# Patient Record
Sex: MALE | Race: WHITE | Employment: UNEMPLOYED | URBAN - METROPOLITAN AREA
[De-identification: names, ages, dates, MRNs, and addresses within clinical notes are randomized per-mention and may not be internally consistent; named-entity substitution may affect disease eponyms.]

---

## 2017-02-06 ENCOUNTER — TELEPHONE (OUTPATIENT)
Dept: RHEUMATOLOGY | Facility: CLINIC | Age: 57
End: 2017-02-06

## 2017-02-06 NOTE — TELEPHONE ENCOUNTER
Called pt, informed that received letter from insurance company stating Enbrel was not going to be covered but Humira is on formulary. Dr. Peg Dior will change medication to Humira 40mg SC every 2 months and teaching will be done at next visit.  Pt has 3 mor

## 2017-02-13 ENCOUNTER — OFFICE VISIT (OUTPATIENT)
Dept: RHEUMATOLOGY | Facility: CLINIC | Age: 57
End: 2017-02-13

## 2017-02-13 VITALS
RESPIRATION RATE: 18 BRPM | HEART RATE: 72 BPM | HEIGHT: 70 IN | DIASTOLIC BLOOD PRESSURE: 82 MMHG | BODY MASS INDEX: 25.62 KG/M2 | SYSTOLIC BLOOD PRESSURE: 170 MMHG | WEIGHT: 179 LBS

## 2017-02-13 DIAGNOSIS — M06.9 RHEUMATOID ARTHRITIS INVOLVING MULTIPLE SITES, UNSPECIFIED RHEUMATOID FACTOR PRESENCE: Primary | ICD-10-CM

## 2017-02-13 PROCEDURE — 99213 OFFICE O/P EST LOW 20 MIN: CPT | Performed by: INTERNAL MEDICINE

## 2017-02-13 RX ORDER — HYDROCODONE BITARTRATE AND ACETAMINOPHEN 10; 325 MG/1; MG/1
TABLET ORAL
Qty: 60 TABLET | Refills: 0 | Status: SHIPPED | OUTPATIENT
Start: 2017-05-11 | End: 2017-05-08

## 2017-02-13 RX ORDER — HYDROCODONE BITARTRATE AND ACETAMINOPHEN 10; 325 MG/1; MG/1
TABLET ORAL
Qty: 60 TABLET | Refills: 0 | Status: SHIPPED | OUTPATIENT
Start: 2017-03-11 | End: 2017-02-13

## 2017-02-13 RX ORDER — HYDROCODONE BITARTRATE AND ACETAMINOPHEN 10; 325 MG/1; MG/1
TABLET ORAL
Qty: 180 TABLET | Refills: 0 | Status: SHIPPED | OUTPATIENT
Start: 2017-02-18 | End: 2017-02-13

## 2017-02-13 RX ORDER — HYDROCODONE BITARTRATE AND ACETAMINOPHEN 10; 325 MG/1; MG/1
TABLET ORAL
Qty: 180 TABLET | Refills: 0 | Status: SHIPPED | OUTPATIENT
Start: 2017-03-18 | End: 2017-02-13

## 2017-02-13 RX ORDER — HYDROCODONE BITARTRATE AND ACETAMINOPHEN 10; 325 MG/1; MG/1
TABLET ORAL
Qty: 180 TABLET | Refills: 0 | Status: CANCELLED | OUTPATIENT
Start: 2017-02-17

## 2017-02-13 RX ORDER — HYDROCODONE BITARTRATE AND ACETAMINOPHEN 10; 325 MG/1; MG/1
TABLET ORAL
Qty: 60 TABLET | Refills: 0 | Status: SHIPPED | OUTPATIENT
Start: 2017-04-11 | End: 2017-02-13

## 2017-02-13 RX ORDER — HYDROCODONE BITARTRATE AND ACETAMINOPHEN 10; 325 MG/1; MG/1
TABLET ORAL
Qty: 180 TABLET | Refills: 0 | Status: SHIPPED | OUTPATIENT
Start: 2017-04-18 | End: 2017-02-13

## 2017-02-13 NOTE — PATIENT INSTRUCTIONS
Continue Enbrel 50 mg syringe weekly SC. Pain take Norco 2 tablets 4 times a day.   Because of insurance problems 180 is given initially and then 60 more later in the month because insurance will only pay for 180 per month yet the patient needs a total of

## 2017-02-13 NOTE — PROGRESS NOTES
[facility]  Dr. Peg Dior Progress Note     Subjective:   Arie Badillo is a(n) 64year old male. Current complaints: Patient presents with:  Rheumatoid Arthritis: 3 month f/u. Pt unsure of what is covered with insurance-looking into.  [employer] stat

## 2017-03-06 ENCOUNTER — TELEPHONE (OUTPATIENT)
Dept: RHEUMATOLOGY | Facility: CLINIC | Age: 57
End: 2017-03-06

## 2017-03-09 NOTE — TELEPHONE ENCOUNTER
Per pt, he has received Enbrel last week with medicaid. Informed pt that [facility] was denied, not on formulary-must have previous trial and failure on at least 2 formulary such as Franko Simpson Catching or Remicade.

## 2017-03-14 ENCOUNTER — TELEPHONE (OUTPATIENT)
Dept: RHEUMATOLOGY | Facility: CLINIC | Age: 57
End: 2017-03-14

## 2017-03-14 NOTE — TELEPHONE ENCOUNTER
Pt called stating unable to fill Norco 10-325mg Dana #60 tabs in  [place], [place]. RN called pharmacy stating that was true, prescription not valid in [place], must be from [place] [id number].   Pharmacist suggested for pt to go to urgent care to get prescr

## 2017-05-08 ENCOUNTER — OFFICE VISIT (OUTPATIENT)
Dept: RHEUMATOLOGY | Facility: CLINIC | Age: 57
End: 2017-05-08

## 2017-05-08 VITALS
BODY MASS INDEX: 26 KG/M2 | HEART RATE: 76 BPM | WEIGHT: 179 LBS | RESPIRATION RATE: 16 BRPM | SYSTOLIC BLOOD PRESSURE: 172 MMHG | DIASTOLIC BLOOD PRESSURE: 102 MMHG

## 2017-05-08 DIAGNOSIS — M06.9 RHEUMATOID ARTHRITIS INVOLVING MULTIPLE SITES, UNSPECIFIED RHEUMATOID FACTOR PRESENCE: Primary | ICD-10-CM

## 2017-05-08 PROCEDURE — 99213 OFFICE O/P EST LOW 20 MIN: CPT | Performed by: INTERNAL MEDICINE

## 2017-05-08 RX ORDER — HYDROCODONE BITARTRATE AND ACETAMINOPHEN 10; 325 MG/1; MG/1
1 TABLET ORAL EVERY 4 HOURS PRN
Qty: 60 TABLET | Refills: 0 | Status: CANCELLED | OUTPATIENT
Start: 2017-05-08 | End: 2017-06-07

## 2017-05-08 RX ORDER — HYDROCODONE BITARTRATE AND ACETAMINOPHEN 10; 325 MG/1; MG/1
TABLET ORAL
Qty: 60 TABLET | Refills: 0 | Status: SHIPPED | OUTPATIENT
Start: 2017-05-08 | End: 2017-05-08

## 2017-05-08 RX ORDER — HYDROCODONE BITARTRATE AND ACETAMINOPHEN 10; 325 MG/1; MG/1
1 TABLET ORAL EVERY 4 HOURS PRN
Qty: 60 TABLET | Refills: 0 | Status: CANCELLED | OUTPATIENT
Start: 2017-07-07 | End: 2017-08-06

## 2017-05-08 RX ORDER — HYDROCODONE BITARTRATE AND ACETAMINOPHEN 10; 325 MG/1; MG/1
1 TABLET ORAL EVERY 4 HOURS PRN
Qty: 60 TABLET | Refills: 0 | Status: CANCELLED | OUTPATIENT
Start: 2017-06-07 | End: 2017-07-07

## 2017-05-08 RX ORDER — HYDROCODONE BITARTRATE AND ACETAMINOPHEN 10; 325 MG/1; MG/1
2 TABLET ORAL EVERY 6 HOURS PRN
Qty: 180 TABLET | Refills: 0 | Status: SHIPPED | OUTPATIENT
Start: 2017-07-18 | End: 2017-08-17

## 2017-05-08 RX ORDER — HYDROCODONE BITARTRATE AND ACETAMINOPHEN 10; 325 MG/1; MG/1
1 TABLET ORAL EVERY 6 HOURS PRN
Qty: 180 TABLET | Refills: 0 | Status: SHIPPED | OUTPATIENT
Start: 2017-06-07 | End: 2017-05-08 | Stop reason: DRUGHIGH

## 2017-05-08 RX ORDER — HYDROCODONE BITARTRATE AND ACETAMINOPHEN 10; 325 MG/1; MG/1
1 TABLET ORAL EVERY 6 HOURS PRN
Qty: 180 TABLET | Refills: 0 | Status: CANCELLED | OUTPATIENT
Start: 2017-07-07 | End: 2017-08-06

## 2017-05-08 RX ORDER — HYDROCODONE BITARTRATE AND ACETAMINOPHEN 10; 325 MG/1; MG/1
1 TABLET ORAL EVERY 6 HOURS PRN
Qty: 180 TABLET | Refills: 0 | Status: SHIPPED | OUTPATIENT
Start: 2017-06-18 | End: 2017-05-08

## 2017-05-08 NOTE — PATIENT INSTRUCTIONS
Instructions are to remain on Enbrel 50 mg subcu every week.   For pain take Norco 2 tablets every 4 hours initially and try to wean down to 1 tablet every 4 hours and then finally 1 every 12 hours and then stop the Norco at some point if possible or at [facility] 3

## 2017-05-08 NOTE — PROGRESS NOTES
[facility]  Dr. Gabe Villegas Progress Note     Subjective:   Jermaine Sleeper is a(n) 64year old male. Current complaints: Patient presents with:  Fibromyalgia Syndrome: Pt is moving out of state soon.  Pt states 'is going to start weaning off norco.'  R 4:58 PM

## 2017-07-12 ENCOUNTER — TELEPHONE (OUTPATIENT)
Dept: RHEUMATOLOGY | Facility: CLINIC | Age: 57
End: 2017-07-12

## 2017-07-12 NOTE — TELEPHONE ENCOUNTER
Pt called. Pt has moved to [place]. Pt states 'needs someone from the office to call [facility] in [place]. Needs Enbrel refilled.  Not a big deal just get it done.' CMA informed pt that since has moved out of state, will need to establish care with a new pro

## 2017-07-18 ENCOUNTER — TELEPHONE (OUTPATIENT)
Dept: RHEUMATOLOGY | Facility: CLINIC | Age: 57
End: 2017-07-18

## 2017-09-27 ENCOUNTER — TELEPHONE (OUTPATIENT)
Dept: RHEUMATOLOGY | Facility: CLINIC | Age: 57
End: 2017-09-27

## 2017-09-28 ENCOUNTER — TELEPHONE (OUTPATIENT)
Dept: RHEUMATOLOGY | Facility: CLINIC | Age: 57
End: 2017-09-28

## 2017-09-28 NOTE — TELEPHONE ENCOUNTER
Did a PA for Enbrel over [id number] with [facility]. Received a verbal approval for 6 months. Pharmacy and pt notified.

## 2017-10-26 ENCOUNTER — TELEPHONE (OUTPATIENT)
Dept: RHEUMATOLOGY | Facility: CLINIC | Age: 57
End: 2017-10-26

## 2017-10-26 NOTE — TELEPHONE ENCOUNTER
Pt called stating he is seeing a new rheumatologist/internist in [place], appt was today. Pt is still going to come in for his appt with Dr. Layton Disla on Wed.   Informed pt to get blood work done at [facility] dr Chao Chaudhari, CMP, Sed rate and TB test.  Pt stated he

## 2017-10-31 ENCOUNTER — TELEPHONE (OUTPATIENT)
Dept: RHEUMATOLOGY | Facility: CLINIC | Age: 57
End: 2017-10-31

## 2017-10-31 NOTE — TELEPHONE ENCOUNTER
PT CALLED TODAY TO LET US KNOW HE IS NOW SEEING A RHEUMATOLOGIST IN [place] DR. Adonay Carballo AND WILL NO LONGER BE COMING BACK PER PATIENT  PT RECEIVED  Vira Stringer  #90 ON October 26TH FROM DR. Sandip Garcia

## (undated) NOTE — MR AVS SNAPSHOT
[facility]  [address], [address]  [address] [place] [address] [phone]               Thank you for choosing us for your health care visit with Hieu Valadez MD.  We are glad to serve you and happy to provide you with this Tadalafil 10 MG Tabs   Take 1 tablet (10 mg total) by mouth daily as needed for Erectile Dysfunction.                 Where to Get Your Medications      These medications were sent to [facility]Y #[id number]- [address] [phone], 183-4 Dietary sodium reduction Reduce dietary sodium intake to <= 100 mmol per day (2.4 g sodium or 6 g sodium chloride)   Aerobic physical activity Regular aerobic physical activity (e.g., brisk walking, light jogging, cycling, swimming, etc.) for a goal of at

## (undated) NOTE — MR AVS SNAPSHOT
[facility]  [address], [address]  [address] [place] [address] [phone]               Thank you for choosing us for your health care visit with Shelton Salmeron MD.  We are glad to serve you and happy to provide you with this tablet every 4 hours and then finally 1 every 12 hours and then stop the Norco at some point if possible or at least little as Norco as possible. You cannot stop Norco suddenly but you can taper it down to reduce your dependence.   Also you can use over-th Support Staff. Remember, Poly [employer]ptive is NOT to be used for urgent needs. For medical emergencies, dial 911. Visit [email or web address] to learn more.            Visit [facility] online at  [email or web address]